# Patient Record
Sex: MALE | Race: WHITE | Employment: FULL TIME | URBAN - METROPOLITAN AREA
[De-identification: names, ages, dates, MRNs, and addresses within clinical notes are randomized per-mention and may not be internally consistent; named-entity substitution may affect disease eponyms.]

---

## 2018-12-11 ENCOUNTER — HOSPITAL ENCOUNTER (OUTPATIENT)
Age: 63
Setting detail: OBSERVATION
Discharge: NURSING, REHAB OR LONG-TERM CARE | End: 2018-12-11
Attending: EMERGENCY MEDICINE | Admitting: INTERNAL MEDICINE
Payer: COMMERCIAL

## 2018-12-11 ENCOUNTER — APPOINTMENT (OUTPATIENT)
Dept: GENERAL RADIOLOGY | Age: 63
End: 2018-12-11
Attending: EMERGENCY MEDICINE
Payer: COMMERCIAL

## 2018-12-11 ENCOUNTER — APPOINTMENT (OUTPATIENT)
Dept: CT IMAGING | Age: 63
End: 2018-12-11
Attending: EMERGENCY MEDICINE
Payer: COMMERCIAL

## 2018-12-11 VITALS
RESPIRATION RATE: 15 BRPM | TEMPERATURE: 97.6 F | DIASTOLIC BLOOD PRESSURE: 100 MMHG | OXYGEN SATURATION: 94 % | HEART RATE: 88 BPM | SYSTOLIC BLOOD PRESSURE: 118 MMHG

## 2018-12-11 DIAGNOSIS — S32.9XXA FRACTURE OF UNSPECIFIED PARTS OF LUMBOSACRAL SPINE AND PELVIS, INITIAL ENCOUNTER FOR CLOSED FRACTURE (HCC): ICD-10-CM

## 2018-12-11 DIAGNOSIS — S22.41XA CLOSED FRACTURE OF MULTIPLE RIBS OF RIGHT SIDE, INITIAL ENCOUNTER: Primary | ICD-10-CM

## 2018-12-11 PROBLEM — S22.49XA RIB FRACTURES: Status: ACTIVE | Noted: 2018-12-11

## 2018-12-11 LAB
ALBUMIN SERPL-MCNC: 4.2 G/DL (ref 3.5–5)
ALBUMIN/GLOB SERPL: 1.6 {RATIO} (ref 1.1–2.2)
ALP SERPL-CCNC: 96 U/L (ref 45–117)
ALT SERPL-CCNC: 28 U/L (ref 12–78)
ANION GAP BLD CALC-SCNC: 18 MMOL/L (ref 10–20)
AST SERPL-CCNC: 24 U/L (ref 15–37)
BASOPHILS # BLD: 0 K/UL (ref 0–0.1)
BASOPHILS NFR BLD: 0 % (ref 0–1)
BILIRUB DIRECT SERPL-MCNC: 0.3 MG/DL (ref 0–0.2)
BILIRUB SERPL-MCNC: 1.7 MG/DL (ref 0.2–1)
BUN BLD-MCNC: 18 MG/DL (ref 9–20)
CA-I BLD-MCNC: 1.12 MMOL/L (ref 1.12–1.32)
CHLORIDE BLD-SCNC: 91 MMOL/L (ref 98–107)
CO2 BLD-SCNC: 25 MMOL/L (ref 21–32)
CREAT BLD-MCNC: 0.9 MG/DL (ref 0.6–1.3)
DIFFERENTIAL METHOD BLD: ABNORMAL
EOSINOPHIL # BLD: 0.1 K/UL (ref 0–0.4)
EOSINOPHIL NFR BLD: 1 % (ref 0–7)
ERYTHROCYTE [DISTWIDTH] IN BLOOD BY AUTOMATED COUNT: 12.7 % (ref 11.5–14.5)
GLOBULIN SER CALC-MCNC: 2.7 G/DL (ref 2–4)
GLUCOSE BLD-MCNC: 247 MG/DL (ref 65–100)
HCT VFR BLD AUTO: 45.7 % (ref 36.6–50.3)
HCT VFR BLD CALC: 49 % (ref 36.6–50.3)
HGB BLD-MCNC: 15.8 G/DL (ref 12.1–17)
LYMPHOCYTES # BLD: 2.2 K/UL
LYMPHOCYTES NFR BLD: 18 % (ref 12–49)
MCH RBC QN AUTO: 31.2 PG (ref 26–34)
MCHC RBC AUTO-ENTMCNC: 34.6 G/DL (ref 30–36.5)
MCV RBC AUTO: 90.1 FL (ref 80–99)
MONOCYTES # BLD: 0.9 K/UL (ref 0–1)
MONOCYTES NFR BLD: 8 % (ref 5–13)
NEUTS SEG # BLD: 8.7 K/UL (ref 1.8–8)
NEUTS SEG NFR BLD: 73 % (ref 32–75)
PLATELET # BLD AUTO: 320 K/UL (ref 150–400)
PMV BLD AUTO: 10.3 FL (ref 8.9–12.9)
POTASSIUM BLD-SCNC: 5.2 MMOL/L (ref 3.5–5.1)
PROT SERPL-MCNC: 6.9 G/DL (ref 6.4–8.2)
RBC # BLD AUTO: 5.07 M/UL (ref 4.1–5.7)
SERVICE CMNT-IMP: ABNORMAL
SODIUM BLD-SCNC: 127 MMOL/L (ref 136–145)
WBC # BLD AUTO: 11.9 K/UL (ref 4.1–11.1)
XXWBCSUS: 0

## 2018-12-11 PROCEDURE — 99284 EMERGENCY DEPT VISIT MOD MDM: CPT

## 2018-12-11 PROCEDURE — 96374 THER/PROPH/DIAG INJ IV PUSH: CPT

## 2018-12-11 PROCEDURE — 36415 COLL VENOUS BLD VENIPUNCTURE: CPT

## 2018-12-11 PROCEDURE — 71046 X-RAY EXAM CHEST 2 VIEWS: CPT

## 2018-12-11 PROCEDURE — 74011636320 HC RX REV CODE- 636/320: Performed by: EMERGENCY MEDICINE

## 2018-12-11 PROCEDURE — 85025 COMPLETE CBC W/AUTO DIFF WBC: CPT

## 2018-12-11 PROCEDURE — 99218 HC RM OBSERVATION: CPT

## 2018-12-11 PROCEDURE — 80076 HEPATIC FUNCTION PANEL: CPT

## 2018-12-11 PROCEDURE — 80047 BASIC METABLC PNL IONIZED CA: CPT

## 2018-12-11 PROCEDURE — 74011250636 HC RX REV CODE- 250/636: Performed by: EMERGENCY MEDICINE

## 2018-12-11 PROCEDURE — 74177 CT ABD & PELVIS W/CONTRAST: CPT

## 2018-12-11 PROCEDURE — 96375 TX/PRO/DX INJ NEW DRUG ADDON: CPT

## 2018-12-11 PROCEDURE — 96376 TX/PRO/DX INJ SAME DRUG ADON: CPT

## 2018-12-11 PROCEDURE — 74011250637 HC RX REV CODE- 250/637: Performed by: EMERGENCY MEDICINE

## 2018-12-11 RX ORDER — SODIUM CHLORIDE 0.9 % (FLUSH) 0.9 %
5-10 SYRINGE (ML) INJECTION AS NEEDED
Status: DISCONTINUED | OUTPATIENT
Start: 2018-12-11 | End: 2018-12-11 | Stop reason: HOSPADM

## 2018-12-11 RX ORDER — MORPHINE SULFATE 2 MG/ML
4 INJECTION, SOLUTION INTRAMUSCULAR; INTRAVENOUS ONCE
Status: COMPLETED | OUTPATIENT
Start: 2018-12-11 | End: 2018-12-11

## 2018-12-11 RX ORDER — HYDROCODONE BITARTRATE AND ACETAMINOPHEN 7.5; 325 MG/1; MG/1
1 TABLET ORAL
Status: DISCONTINUED | OUTPATIENT
Start: 2018-12-11 | End: 2018-12-11 | Stop reason: HOSPADM

## 2018-12-11 RX ORDER — NALOXONE HYDROCHLORIDE 0.4 MG/ML
0.4 INJECTION, SOLUTION INTRAMUSCULAR; INTRAVENOUS; SUBCUTANEOUS AS NEEDED
Status: DISCONTINUED | OUTPATIENT
Start: 2018-12-11 | End: 2018-12-11 | Stop reason: HOSPADM

## 2018-12-11 RX ORDER — HYDROMORPHONE HYDROCHLORIDE 1 MG/ML
1 INJECTION, SOLUTION INTRAMUSCULAR; INTRAVENOUS; SUBCUTANEOUS
Status: DISCONTINUED | OUTPATIENT
Start: 2018-12-11 | End: 2018-12-11 | Stop reason: HOSPADM

## 2018-12-11 RX ORDER — ONDANSETRON 2 MG/ML
4 INJECTION INTRAMUSCULAR; INTRAVENOUS
Status: DISCONTINUED | OUTPATIENT
Start: 2018-12-11 | End: 2018-12-11 | Stop reason: HOSPADM

## 2018-12-11 RX ORDER — DIAZEPAM 5 MG/1
5 TABLET ORAL
Status: COMPLETED | OUTPATIENT
Start: 2018-12-11 | End: 2018-12-11

## 2018-12-11 RX ORDER — MORPHINE SULFATE 4 MG/ML
4 INJECTION, SOLUTION INTRAMUSCULAR; INTRAVENOUS
Status: COMPLETED | OUTPATIENT
Start: 2018-12-11 | End: 2018-12-11

## 2018-12-11 RX ORDER — SODIUM CHLORIDE 0.9 % (FLUSH) 0.9 %
5-10 SYRINGE (ML) INJECTION EVERY 8 HOURS
Status: DISCONTINUED | OUTPATIENT
Start: 2018-12-11 | End: 2018-12-11 | Stop reason: HOSPADM

## 2018-12-11 RX ORDER — DIAZEPAM 10 MG/2ML
5 INJECTION INTRAMUSCULAR
Status: DISCONTINUED | OUTPATIENT
Start: 2018-12-11 | End: 2018-12-11 | Stop reason: RX

## 2018-12-11 RX ORDER — ONDANSETRON 2 MG/ML
4 INJECTION INTRAMUSCULAR; INTRAVENOUS
Status: COMPLETED | OUTPATIENT
Start: 2018-12-11 | End: 2018-12-11

## 2018-12-11 RX ORDER — GUAIFENESIN 100 MG/5ML
81 LIQUID (ML) ORAL DAILY
COMMUNITY

## 2018-12-11 RX ORDER — ZOLPIDEM TARTRATE 5 MG/1
5 TABLET ORAL
Status: DISCONTINUED | OUTPATIENT
Start: 2018-12-11 | End: 2018-12-11 | Stop reason: HOSPADM

## 2018-12-11 RX ORDER — LIDOCAINE 4 G/100G
2 PATCH TOPICAL EVERY 24 HOURS
Status: DISCONTINUED | OUTPATIENT
Start: 2018-12-11 | End: 2018-12-11 | Stop reason: HOSPADM

## 2018-12-11 RX ORDER — HYDROCODONE BITARTRATE AND ACETAMINOPHEN 5; 325 MG/1; MG/1
1 TABLET ORAL
Qty: 14 TAB | Refills: 0 | Status: SHIPPED | OUTPATIENT
Start: 2018-12-11

## 2018-12-11 RX ORDER — ACETAMINOPHEN 325 MG/1
650 TABLET ORAL
Status: DISCONTINUED | OUTPATIENT
Start: 2018-12-11 | End: 2018-12-11 | Stop reason: HOSPADM

## 2018-12-11 RX ORDER — ENOXAPARIN SODIUM 100 MG/ML
40 INJECTION SUBCUTANEOUS EVERY 24 HOURS
Status: DISCONTINUED | OUTPATIENT
Start: 2018-12-11 | End: 2018-12-11 | Stop reason: HOSPADM

## 2018-12-11 RX ORDER — KETOROLAC TROMETHAMINE 30 MG/ML
15 INJECTION, SOLUTION INTRAMUSCULAR; INTRAVENOUS EVERY 6 HOURS
Status: DISCONTINUED | OUTPATIENT
Start: 2018-12-11 | End: 2018-12-11 | Stop reason: HOSPADM

## 2018-12-11 RX ORDER — MORPHINE SULFATE 4 MG/ML
4 INJECTION, SOLUTION INTRAMUSCULAR; INTRAVENOUS ONCE
Status: COMPLETED | OUTPATIENT
Start: 2018-12-11 | End: 2018-12-11

## 2018-12-11 RX ADMIN — MORPHINE SULFATE 4 MG: 4 INJECTION, SOLUTION INTRAMUSCULAR; INTRAVENOUS at 11:39

## 2018-12-11 RX ADMIN — ONDANSETRON 4 MG: 2 INJECTION INTRAMUSCULAR; INTRAVENOUS at 09:02

## 2018-12-11 RX ADMIN — DIAZEPAM 5 MG: 5 TABLET ORAL at 13:02

## 2018-12-11 RX ADMIN — MORPHINE SULFATE 4 MG: 4 INJECTION, SOLUTION INTRAMUSCULAR; INTRAVENOUS at 09:03

## 2018-12-11 RX ADMIN — MORPHINE SULFATE 4 MG: 2 INJECTION, SOLUTION INTRAMUSCULAR; INTRAVENOUS at 10:00

## 2018-12-11 RX ADMIN — IOPAMIDOL 100 ML: 755 INJECTION, SOLUTION INTRAVENOUS at 10:12

## 2018-12-11 NOTE — ED NOTES
TRANSFER - OUT REPORT:    Verbal report given to Soto David RN  (name) on Gene Course  being transferred to [facility] ED (unit) for routine progression of care       Report consisted of patients Situation, Background, Assessment and   Recommendations(SBAR). Information from the following report(s) SBAR was reviewed with the receiving nurse. Lines:   Peripheral IV 12/11/18 (Active)   Site Assessment Clean, dry, & intact 12/11/2018  8:40 AM   Phlebitis Assessment 0 12/11/2018  8:40 AM   Infiltration Assessment 0 12/11/2018  8:40 AM   Dressing Status Clean, dry, & intact 12/11/2018  8:40 AM   Dressing Type Transparent 12/11/2018  8:40 AM        Opportunity for questions and clarification was provided.       Patient transported with:   Monitor

## 2018-12-11 NOTE — ED NOTES
Transfer Assessment: Patient A&O x4 and in no distress. Physical re-examination reveals some improvement in pts condition with reassessment of vital signs completed at the time of admission transfer and/or discharge. Tolerated 12 mg IV morphine and oral Valium and an ice pack and has diminished pain at this time.

## 2018-12-11 NOTE — ED NOTES
Patient calm and talking on 2 phones at this time. Less pain at this time.  Reported off to [facility] ED and [facility] Transport team

## 2018-12-11 NOTE — ED TRIAGE NOTES
Patient slipped down one step on the ice and landed on his right lower back. Pt has a preexisting right abdomen insulin pump. Patient hollering \"oh God\" upon arrival on stretcher per EMS. EMS started left [facility] INT with a 20 g ca.  Dr Buddy Candelaria at bedside upon arrival

## 2018-12-11 NOTE — ED NOTES
Called to CT because patient refused to have the test done, R/T pain. Administered IV Morphine while in scanner at 10 am. Patient tolerated well

## 2018-12-11 NOTE — ED NOTES
After much discussion, patient has decided to go to a [facility] facility.  Now waiting for Transfer Center from [facility] to call and talk to Dr Angel Ruelas

## 2018-12-11 NOTE — ED NOTES
TRANSFER - OUT REPORT:    Verbal report given to Ishmael Wakefield Paramedic (name) on Mendel Raza  being transferred to [facility] ED (unit) for routine progression of care       Report consisted of patients Situation, Background, Assessment and   Recommendations(SBAR). Information from the following report(s) SBAR was reviewed with the receiving nurse. Lines:   Peripheral IV 12/11/18 (Active)   Site Assessment Clean, dry, & intact 12/11/2018  8:40 AM   Phlebitis Assessment 0 12/11/2018  8:40 AM   Infiltration Assessment 0 12/11/2018  8:40 AM   Dressing Status Clean, dry, & intact 12/11/2018  8:40 AM   Dressing Type Transparent 12/11/2018  8:40 AM        Opportunity for questions and clarification was provided.       Patient transported with:   Monitor

## 2018-12-11 NOTE — ED NOTES
Patient swinging legs off side of bed to void into urinal. Moaning less and seems to be tolerated his back pain more at this time

## 2018-12-11 NOTE — ED PROVIDER NOTES
HPI  
The patient is a 15-year-old white male who slipped on the ice and fell on the stairs injuring his right posterior flank and right very lower ribs. He is extremely tender over the area but there is no bruising noted and there is no crepitation. Any movement makes him cry out in pain. He did not injure his extremities or his lower extremities. He was able to walk. The patient is a type I insulin-dependent diabetic on an insulin pump and has history of having had DKA in the past. It was dilated to do a CT abdomen and pelvis with contrast as he has normal renal function to evaluate this injury to his flank and back. This test is pending at time of this dictation. Past Medical History:  
Diagnosis Date  Diabetes ([facility] [address]  Hypertension Past Surgical History:  
Procedure Laterality Date  OTHER CELL    
 no prior surgeries Family History:  
Problem Relation Age of Onset  Heart Attack Mother  Diabetes Mother  Hypertension Mother  Heart Disease Father  Diabetes Father Social History Socioeconomic History  Marital status: SINGLE Spouse name: Not on file  Number of children: Not on file  Years of education: Not on file  Highest education level: Not on file Social Needs  Financial resource strain: Not on file  Food insecurity - worry: Not on file  Food insecurity - inability: Not on file  Transportation needs - medical: Not on file  Transportation needs - non-medical: Not on file Occupational History  Not on file Tobacco Use  Smoking status: Current Every Day Smoker Packs/day: 1.00 Years: 45.00 Pack years: 45.00  Smokeless tobacco: Never Used Substance and Sexual Activity  Alcohol use: Yes Alcohol/week: 2.5 oz Types: 3 Cans of beer, 2 Shots of liquor per week  Drug use: No  
 Sexual activity: Yes Other Topics Concern  Not on file Social History Narrative  Not on file ALLERGIES: Patient has no known allergies. Review of Systems All other systems reviewed and are negative. Vitals:  
 12/11/18 6739 BP: 187/85 Pulse: 89 Resp: 18 Temp: 97.6 °F (36.4 °C) SpO2: 95% Physical Exam  
Constitutional: He is oriented to person, place, and time. He appears well-developed and well-nourished. HENT:  
Head: Normocephalic and atraumatic. Mouth/Throat: Oropharynx is clear and moist. No oropharyngeal exudate. Eyes: Conjunctivae are normal. No scleral icterus. Neck: Neck supple. No thyromegaly present. Cardiovascular: Normal rate, regular rhythm and normal heart sounds. Exam reveals no gallop and no friction rub. No murmur heard. Pulmonary/Chest: Effort normal and breath sounds normal. No stridor. No respiratory distress. He has no wheezes. He has no rales. Abdominal: Soft. Bowel sounds are normal. There is no tenderness. There is no rebound and no guarding. Tender posterior flank and back Musculoskeletal: Normal range of motion. Lymphadenopathy:  
  He has no cervical adenopathy. Neurological: He is alert and oriented to person, place, and time. Skin: Skin is warm and dry. Psychiatric: He has a normal mood and affect. Nursing note and vitals reviewed. MDM Number of Diagnoses or Management Options Closed fracture of multiple ribs of right side, initial encounter:  
Fracture of unspecified parts of lumbosacral spine and pelvis, initial encounter for closed fracture [facility]): Amount and/or Complexity of Data Reviewed Clinical lab tests: ordered and reviewed Tests in the radiology section of CPT®: ordered and reviewed Tests in the medicine section of CPT®: ordered and reviewed Discussion of test results with the performing providers: yes Obtain history from someone other than the patient: yes Discuss the patient with other providers: yes Procedures Assessment and plan    is to Dr. Luis Colin at [facility] who accepted the patient for multiple rib fractures and L2 transverse process fracture and then I spoke to Harleen Farooq  who will accept him to the ER Total critical care time spent exclusive of procedures: 32  minutes

## 2021-09-15 ENCOUNTER — HOSPITAL ENCOUNTER (OUTPATIENT)
Dept: MRI IMAGING | Age: 66
Discharge: HOME | End: 2021-09-15
Attending: PHYSICIAN ASSISTANT
Payer: COMMERCIAL

## 2021-09-15 ENCOUNTER — HOSPITAL ENCOUNTER (OUTPATIENT)
Dept: GENERAL RADIOLOGY | Age: 66
Discharge: HOME | End: 2021-09-15
Attending: PHYSICIAN ASSISTANT
Payer: COMMERCIAL

## 2021-09-15 ENCOUNTER — TRANSCRIBE ORDER (OUTPATIENT)
Dept: SCHEDULING | Age: 66
End: 2021-09-15

## 2021-09-15 DIAGNOSIS — I63.9 STROKE DETERMINED BY CLINICAL ASSESSMENT (HCC): ICD-10-CM

## 2021-09-15 DIAGNOSIS — I63.9 STROKE DETERMINED BY CLINICAL ASSESSMENT (HCC): Primary | ICD-10-CM

## 2021-09-15 PROCEDURE — 74011250636 HC RX REV CODE- 250/636

## 2021-09-15 PROCEDURE — A9576 INJ PROHANCE MULTIPACK: HCPCS

## 2021-09-15 PROCEDURE — 70553 MRI BRAIN STEM W/O & W/DYE: CPT

## 2021-09-15 PROCEDURE — 70030 X-RAY EYE FOR FOREIGN BODY: CPT

## 2021-09-15 RX ADMIN — GADOTERIDOL 15 ML: 279.3 INJECTION, SOLUTION INTRAVENOUS at 17:34

## 2022-03-19 PROBLEM — S22.49XA RIB FRACTURES: Status: ACTIVE | Noted: 2018-12-11

## 2023-05-11 RX ORDER — ASPIRIN 81 MG/1
81 TABLET, CHEWABLE ORAL DAILY
COMMUNITY

## 2023-05-11 RX ORDER — FAMOTIDINE 10 MG
TABLET ORAL 2 TIMES DAILY
COMMUNITY

## 2023-05-11 RX ORDER — PRAVASTATIN SODIUM 10 MG
TABLET ORAL
COMMUNITY

## 2023-05-11 RX ORDER — HYDROCODONE BITARTRATE AND ACETAMINOPHEN 5; 325 MG/1; MG/1
1 TABLET ORAL EVERY 4 HOURS PRN
COMMUNITY
Start: 2018-12-11

## 2023-05-11 RX ORDER — VALSARTAN 40 MG/1
TABLET ORAL DAILY
COMMUNITY